# Patient Record
Sex: MALE | Race: WHITE | NOT HISPANIC OR LATINO | Employment: OTHER | URBAN - METROPOLITAN AREA
[De-identification: names, ages, dates, MRNs, and addresses within clinical notes are randomized per-mention and may not be internally consistent; named-entity substitution may affect disease eponyms.]

---

## 2018-04-18 ENCOUNTER — OFFICE VISIT (OUTPATIENT)
Dept: RETAIL CLINIC | Facility: CLINIC | Age: 68
End: 2018-04-18

## 2018-04-18 VITALS
SYSTOLIC BLOOD PRESSURE: 130 MMHG | OXYGEN SATURATION: 98 % | RESPIRATION RATE: 18 BRPM | HEART RATE: 84 BPM | DIASTOLIC BLOOD PRESSURE: 80 MMHG | TEMPERATURE: 98 F

## 2018-04-18 DIAGNOSIS — L23.7 POISON IVY DERMATITIS: Primary | ICD-10-CM

## 2018-04-18 PROCEDURE — 99213 OFFICE O/P EST LOW 20 MIN: CPT | Performed by: NURSE PRACTITIONER

## 2018-04-18 RX ORDER — LEVOTHYROXINE SODIUM 0.1 MG/1
100 TABLET ORAL DAILY
COMMUNITY
End: 2022-04-27

## 2018-04-18 RX ORDER — PREDNISONE 10 MG/1
TABLET ORAL
Qty: 21 TABLET | Refills: 0 | Status: SHIPPED | OUTPATIENT
Start: 2018-04-18 | End: 2022-04-27

## 2018-04-18 NOTE — PROGRESS NOTES
Subjective   Ad Aguirre is a 68 y.o. male.     Poison Sepideh   This is a new problem. The current episode started yesterday. The problem has been gradually worsening since onset. The affected locations include the left wrist. The rash is characterized by dryness and redness. He was exposed to plant contact. Pertinent negatives include no cough. Past treatments include anti-itch cream. The treatment provided mild relief.        The following portions of the patient's history were reviewed and updated as appropriate: allergies, current medications, past family history, past medical history, past social history, past surgical history and problem list.    Review of Systems   Constitutional: Negative.    HENT: Negative.    Eyes: Negative.    Respiratory: Negative.  Negative for cough.    Cardiovascular: Negative.    Gastrointestinal: Negative.    Skin: Positive for color change and rash.        Few rashes on the left wrist and right side of the wrist and few rashes on the left side of the face       Objective   Physical Exam   Constitutional: He appears well-developed and well-nourished.   HENT:   Head: Normocephalic and atraumatic.   Right Ear: External ear normal.   Left Ear: External ear normal.   Nose: Mucosal edema present. Right sinus exhibits no maxillary sinus tenderness and no frontal sinus tenderness. Left sinus exhibits no maxillary sinus tenderness and no frontal sinus tenderness.   Mouth/Throat: No oropharyngeal exudate, posterior oropharyngeal edema or posterior oropharyngeal erythema.   Eyes: Pupils are equal, round, and reactive to light.   Neck: Normal range of motion.   Cardiovascular: Normal rate, regular rhythm and normal heart sounds.    Pulmonary/Chest: No respiratory distress. He has no decreased breath sounds. He has wheezes. He has no rhonchi. He has no rales. He exhibits tenderness.   Abdominal: Soft. Bowel sounds are normal.   Skin: Rash noted. Rash is maculopapular and urticarial. There is  erythema.   1 cm macules present on the left wrist and right side wrist and 2 macules on the left side of the face    Nursing note and vitals reviewed.      Assessment/Plan   Ad was seen today for poison ivy.    Diagnoses and all orders for this visit:    Poison ivy dermatitis  -     predniSONE (DELTASONE) 10 MG tablet; 10 mg derrell        Talked to the patient about the diagnosis and educate the patient and advise to visit to PCP if the symptoms worsens

## 2018-04-18 NOTE — PATIENT INSTRUCTIONS
Poison Ivy Dermatitis  Poison ivy dermatitis is inflammation of the skin that is caused by the allergens on the leaves of the poison ivy plant. The skin reaction often involves redness, swelling, blisters, and extreme itching.  What are the causes?  This condition is caused by a specific chemical (urushiol) found in the sap of the poison ivy plant. This chemical is sticky and can be easily spread to people, animals, and objects. You can get poison ivy dermatitis by:  · Having direct contact with a poison ivy plant.  · Touching animals, other people, or objects that have come in contact with poison ivy and have the chemical on them.  What increases the risk?  This condition is more likely to develop in:  · People who are outdoors often.  · People who go outdoors without wearing protective clothing, such as closed shoes, long pants, and a long-sleeved shirt.  What are the signs or symptoms?  Symptoms of this condition include:  · Redness and itching.  · A rash that often includes bumps and blisters. The rash usually appears 48 hours after exposure.  · Swelling. This may occur if the reaction is more severe.  Symptoms usually last for 1-2 weeks. However, the first time you develop this condition, symptoms may last 3-4 weeks.  How is this diagnosed?  This condition may be diagnosed based on your symptoms and a physical exam. Your health care provider may also ask you about any recent outdoor activity.  How is this treated?  Treatment for this condition will vary depending on how severe it is. Treatment may include:  · Hydrocortisone creams or calamine lotions to relieve itching.  · Oatmeal baths to soothe the skin.  · Over-the-counter antihistamine tablets.  · Oral steroid medicine for more severe outbreaks.  Follow these instructions at home:  · Take or apply over-the-counter and prescription medicines only as told by your health care provider.  · Wash exposed skin as soon as possible with soap and cold water.  · Use  hydrocortisone creams or calamine lotion as needed to soothe the skin and relieve itching.  · Take oatmeal baths as needed. Use colloidal oatmeal. You can get this at your local pharmacy or grocery store. Follow the instructions on the packaging.  · Do not scratch or rub your skin.  · While you have the rash, wash clothes right after you wear them.  How is this prevented?  · Learn to identify the poison ivy plant and avoid contact with the plant. This plant can be recognized by the number of leaves. Generally, poison ivy has three leaves with flowering branches on a single stem. The leaves are typically glossy, and they have jagged edges that come to a point at the front.  · If you have been exposed to poison ivy, thoroughly wash with soap and water right away. You have about 30 minutes to remove the plant resin before it will cause the rash. Be sure to wash under your fingernails because any plant resin there will continue to spread the rash.  · When hiking or camping, wear clothes that will help you to avoid exposure on the skin. This includes long pants, a long-sleeved shirt, tall socks, and hiking boots. You can also apply preventive lotion to your skin to help limit exposure.  · If you suspect that your clothes or outdoor gear came in contact with poison ivy, rinse them off outside with a garden hose before you bring them inside your house.  Contact a health care provider if:  · You have open sores in the rash area.  · You have more redness, swelling, or pain in the affected area.  · You have redness that spreads beyond the rash area.  · You have fluid, blood, or pus coming from the affected area.  · You have a fever.  · You have a rash over a large area of your body.  · You have a rash on your eyes, mouth, or genitals.  · Your rash does not improve after a few days.  Get help right away if:  · Your face swells or your eyes swell shut.  · You have trouble breathing.  · You have trouble swallowing.  This  information is not intended to replace advice given to you by your health care provider. Make sure you discuss any questions you have with your health care provider.  Document Released: 12/15/2001 Document Revised: 05/25/2017 Document Reviewed: 05/25/2016  [employer] Patient Education © 2017 [employer]  Talked to the patient about the diagnosis and educate the patient and advise to visit to PCP if the symptoms worsens

## 2022-04-27 ENCOUNTER — HOSPITAL ENCOUNTER (EMERGENCY)
Facility: HOSPITAL | Age: 72
Discharge: HOME | End: 2022-04-27
Attending: EMERGENCY MEDICINE | Admitting: EMERGENCY MEDICINE

## 2022-04-27 ENCOUNTER — APPOINTMENT (OUTPATIENT)
Dept: GENERAL RADIOLOGY | Facility: HOSPITAL | Age: 72
End: 2022-04-27

## 2022-04-27 VITALS
SYSTOLIC BLOOD PRESSURE: 178 MMHG | TEMPERATURE: 97.9 F | DIASTOLIC BLOOD PRESSURE: 101 MMHG | RESPIRATION RATE: 20 BRPM | WEIGHT: 233 LBS | OXYGEN SATURATION: 96 % | BODY MASS INDEX: 29.9 KG/M2 | HEIGHT: 74 IN | HEART RATE: 69 BPM

## 2022-04-27 DIAGNOSIS — R07.89 CHEST WALL PAIN: Primary | ICD-10-CM

## 2022-04-27 DIAGNOSIS — J98.6 ELEVATED HEMIDIAPHRAGM: ICD-10-CM

## 2022-04-27 PROCEDURE — 71101 X-RAY EXAM UNILAT RIBS/CHEST: CPT

## 2022-04-27 PROCEDURE — 99283 EMERGENCY DEPT VISIT LOW MDM: CPT | Performed by: EMERGENCY MEDICINE

## 2022-04-27 PROCEDURE — 99282 EMERGENCY DEPT VISIT SF MDM: CPT

## 2022-04-27 RX ORDER — HYDROCODONE BITARTRATE AND ACETAMINOPHEN 5; 325 MG/1; MG/1
1 TABLET ORAL EVERY 6 HOURS PRN
Qty: 12 TABLET | Refills: 0 | Status: SHIPPED | OUTPATIENT
Start: 2022-04-27

## 2022-04-27 RX ORDER — HYDROCODONE BITARTRATE AND ACETAMINOPHEN 5; 325 MG/1; MG/1
1 TABLET ORAL ONCE
Status: DISCONTINUED | OUTPATIENT
Start: 2022-04-27 | End: 2022-04-27 | Stop reason: HOSPADM

## 2022-05-06 ENCOUNTER — HOSPITAL ENCOUNTER (EMERGENCY)
Facility: HOSPITAL | Age: 72
Discharge: HOME | End: 2022-05-06
Attending: EMERGENCY MEDICINE | Admitting: EMERGENCY MEDICINE

## 2022-05-06 VITALS
HEART RATE: 64 BPM | HEIGHT: 74 IN | DIASTOLIC BLOOD PRESSURE: 99 MMHG | RESPIRATION RATE: 18 BRPM | OXYGEN SATURATION: 94 % | WEIGHT: 235 LBS | TEMPERATURE: 98.7 F | SYSTOLIC BLOOD PRESSURE: 169 MMHG | BODY MASS INDEX: 30.16 KG/M2
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
## 2022-05-06 DIAGNOSIS — S30.863A TICK BITE OF SCROTUM, INITIAL ENCOUNTER: ICD-10-CM

## 2022-05-06 DIAGNOSIS — N49.2 CELLULITIS OF SCROTUM: Primary | ICD-10-CM

## 2022-05-06 PROCEDURE — 99283 EMERGENCY DEPT VISIT LOW MDM: CPT

## 2022-05-06 PROCEDURE — 99282 EMERGENCY DEPT VISIT SF MDM: CPT | Performed by: EMERGENCY MEDICINE

## 2022-05-06 RX ORDER — DOXYCYCLINE 100 MG/1
100 CAPSULE ORAL ONCE
Status: COMPLETED | OUTPATIENT
Start: 2022-05-06 | End: 2022-05-06

## 2022-05-06 RX ORDER — DOXYCYCLINE 100 MG/1
100 CAPSULE ORAL 2 TIMES DAILY
Qty: 20 CAPSULE | Refills: 0 | Status: SHIPPED | OUTPATIENT
Start: 2022-05-06 | End: 2022-05-16

## 2022-05-06 RX ADMIN — DOXYCYCLINE 100 MG: 100 CAPSULE ORAL at 05:06

## 2022-05-06 NOTE — ED PROVIDER NOTES
Subjective     History provided by:  Patient    History of Present Illness    · Chief complaint: Tick bite    · Location: Scrotum on the right side    · Quality/Severity: Redness and swelling of the scrotum on the right side    · Timing/Onset: The patient pulled the tick off of the scrotum yesterday about noon.    · Modifying Factors: He was bitten by a tick    · Associated symptoms: Denies any fever.  Denies a headache.    · Narrative: The patient is a 72-year-old white male who found a tick embedded in the right side of his scrotum.  He remove the tick but subsequently has developed redness and swelling on the right side of the scrotum that is now extending up a little into the inguinal area of the abdomen.  He states the redness feels firm.  He denies any fever.  The patient states that he normally has reactions to insect bites and while stains were he swells up.    Review of Systems   Constitutional: Negative for activity change, appetite change, chills, diaphoresis, fatigue and fever.   HENT: Negative for congestion, dental problem, ear pain, hearing loss, mouth sores, postnasal drip, rhinorrhea, sinus pressure, sore throat and voice change.    Eyes: Negative for photophobia, pain, discharge, redness and visual disturbance.   Respiratory: Negative for cough, chest tightness, shortness of breath, wheezing and stridor.    Cardiovascular: Negative for chest pain, palpitations and leg swelling.   Gastrointestinal: Negative for abdominal pain, diarrhea, nausea and vomiting.   Genitourinary: Negative for difficulty urinating, dysuria, flank pain, frequency, hematuria and urgency.   Musculoskeletal: Negative for arthralgias, back pain, gait problem, joint swelling, myalgias, neck pain and neck stiffness.   Skin: Positive for color change ( Redness on the right side of the scrotum). Negative for rash.   Neurological: Negative for dizziness, tremors, seizures, syncope, facial asymmetry, speech difficulty, weakness,  "light-headedness, numbness and headaches.   Hematological: Negative for adenopathy.   Psychiatric/Behavioral: Negative.  Negative for confusion and decreased concentration. The patient is not nervous/anxious.      Past Medical History:   Diagnosis Date   • Disease of thyroid gland    • Elevated cholesterol    • Hypertension      /99   Pulse 64   Temp 98.7 °F (37.1 °C) (Oral)   Resp 18   Ht 188 cm (74\")   Wt 107 kg (235 lb)   SpO2 94%   BMI 30.17 kg/m²     Past Medical History:   Diagnosis Date   • Disease of thyroid gland    • Elevated cholesterol    • Hypertension        No Known Allergies    History reviewed. No pertinent surgical history.    Family History   Problem Relation Age of Onset   • Heart attack Father    • Cancer Child        Social History     Socioeconomic History   • Marital status:    Tobacco Use   • Smoking status: Never Smoker   • Smokeless tobacco: Never Used   Substance and Sexual Activity   • Alcohol use: Yes     Comment: social   • Drug use: Defer   • Sexual activity: Defer           Objective   Physical Exam  Vitals and nursing note reviewed.   Constitutional:       General: He is not in acute distress.     Appearance: Normal appearance. He is normal weight. He is not ill-appearing, toxic-appearing or diaphoretic.      Comments: The patient appears healthy in no acute distress.  Review of his vital signs: He is afebrile with a temperature of 98.7, blood pressure elevated 169/99, remainder vital signs within normal limits.   HENT:      Head: Normocephalic and atraumatic.   Genitourinary:     Comments: The patient has redness and thickening of the skin of the right scrotum consistent with cellulitis versus a local allergic reaction.  There is no definite abscess or bite site.  The testes are nontender and descended.  No urethral discharge or ulcers.  No inguinal lymphadenopathy.  Skin:     General: Skin is warm.      Capillary Refill: Capillary refill takes less than 2 " seconds.      Comments: Erythema and firmness of the right scrotum extending slightly into the right inguinal area consistent with cellulitis versus local allergic reaction.   Neurological:      General: No focal deficit present.      Mental Status: He is alert and oriented to person, place, and time.      Cranial Nerves: No cranial nerve deficit.      Sensory: No sensory deficit.      Motor: No weakness.   Psychiatric:         Mood and Affect: Mood normal.         Behavior: Behavior normal.         Thought Content: Thought content normal.         Judgment: Judgment normal.         Procedures           ED Course  ED Course as of 05/06/22 0512   Fri May 06, 2022   0511 Is my impression the patient has scrotal cellulitis secondary to the tick bite versus local allergic reaction.  He is not a diabetic.  He will be placed on doxycycline.  He is instructed to take over-the-counter Benadryl.  He was instructed to make an appointment to [facility]. [TP]      ED Course User Index  [TP] Derek Harman MD                                                 MDM  Number of Diagnoses or Management Options  Cellulitis of scrotum: new and does not require workup  Tick bite of scrotum, initial encounter: new and does not require workup  Risk of Complications, Morbidity, and/or Mortality  Presenting problems: low  Diagnostic procedures: minimal  Management options: low    Patient Progress  Patient progress: stable      Final diagnoses:   Cellulitis of scrotum   Tick bite of scrotum, initial encounter       ED Disposition  ED Disposition     ED Disposition   Discharge    Condition   Stable    Comment   --             [facility]    Schedule an appointment as soon as possible for a visit in 4 days           Medication List      New Prescriptions    doxycycline 100 MG capsule  Commonly known as: MONODOX  Take 1 capsule by mouth 2 (Two) Times a Day for 10 days.           Where to Get Your  Medications      These medications were sent to [facility] - [place], [place] - [address] [address] - [phone] [place] [phone]   [address] [address]    Phone: [phone]   · doxycycline 100 MG capsule         Labs Reviewed - No data to display  No orders to display          Medication List      New Prescriptions    doxycycline 100 MG capsule  Commonly known as: MONODOX  Take 1 capsule by mouth 2 (Two) Times a Day for 10 days.           Where to Get Your Medications      These medications were sent to [facility] - [place], [place] - [address] [address] - [phone] [place] [phone]   [address] [address]    Phone: [phone]   · doxycycline 100 MG capsule              Derek Harman MD  05/06/22 0512